# Patient Record
Sex: FEMALE | Race: BLACK OR AFRICAN AMERICAN | NOT HISPANIC OR LATINO
[De-identification: names, ages, dates, MRNs, and addresses within clinical notes are randomized per-mention and may not be internally consistent; named-entity substitution may affect disease eponyms.]

---

## 2019-11-08 ENCOUNTER — APPOINTMENT (OUTPATIENT)
Dept: OPHTHALMOLOGY | Facility: CLINIC | Age: 64
End: 2019-11-08
Payer: MEDICARE

## 2019-11-08 ENCOUNTER — NON-APPOINTMENT (OUTPATIENT)
Age: 64
End: 2019-11-08

## 2019-11-08 PROCEDURE — 92012 INTRM OPH EXAM EST PATIENT: CPT

## 2019-11-08 PROCEDURE — 92002 INTRM OPH EXAM NEW PATIENT: CPT

## 2020-01-02 ENCOUNTER — APPOINTMENT (OUTPATIENT)
Dept: OPHTHALMOLOGY | Facility: CLINIC | Age: 65
End: 2020-01-02

## 2020-05-28 ENCOUNTER — APPOINTMENT (OUTPATIENT)
Dept: OPHTHALMOLOGY | Facility: CLINIC | Age: 65
End: 2020-05-28
Payer: MEDICARE

## 2020-05-28 ENCOUNTER — NON-APPOINTMENT (OUTPATIENT)
Age: 65
End: 2020-05-28

## 2020-05-28 PROCEDURE — 99442: CPT | Mod: 95

## 2020-06-01 ENCOUNTER — APPOINTMENT (OUTPATIENT)
Dept: OPHTHALMOLOGY | Facility: CLINIC | Age: 65
End: 2020-06-01

## 2020-06-16 ENCOUNTER — TRANSCRIPTION ENCOUNTER (OUTPATIENT)
Age: 65
End: 2020-06-16

## 2020-11-16 ENCOUNTER — NON-APPOINTMENT (OUTPATIENT)
Age: 65
End: 2020-11-16

## 2020-11-16 ENCOUNTER — APPOINTMENT (OUTPATIENT)
Dept: OPHTHALMOLOGY | Facility: CLINIC | Age: 65
End: 2020-11-16
Payer: MEDICARE

## 2020-11-16 PROCEDURE — 92012 INTRM OPH EXAM EST PATIENT: CPT

## 2020-11-16 PROCEDURE — 92025 CPTRIZED CORNEAL TOPOGRAPHY: CPT

## 2020-11-16 PROCEDURE — 92286 ANT SGM IMG I&R SPECLR MIC: CPT

## 2020-11-16 PROCEDURE — 92136 OPHTHALMIC BIOMETRY: CPT

## 2020-12-28 ENCOUNTER — APPOINTMENT (OUTPATIENT)
Dept: SURGERY | Facility: CLINIC | Age: 65
End: 2020-12-28
Payer: MEDICARE

## 2020-12-28 VITALS
HEIGHT: 68 IN | BODY MASS INDEX: 44.41 KG/M2 | SYSTOLIC BLOOD PRESSURE: 174 MMHG | WEIGHT: 293 LBS | DIASTOLIC BLOOD PRESSURE: 88 MMHG | OXYGEN SATURATION: 88 % | HEART RATE: 86 BPM

## 2020-12-28 DIAGNOSIS — Z87.891 PERSONAL HISTORY OF NICOTINE DEPENDENCE: ICD-10-CM

## 2020-12-28 DIAGNOSIS — I10 ESSENTIAL (PRIMARY) HYPERTENSION: ICD-10-CM

## 2020-12-28 DIAGNOSIS — I21.9 ACUTE MYOCARDIAL INFARCTION, UNSPECIFIED: ICD-10-CM

## 2020-12-28 DIAGNOSIS — K80.20 CALCULUS OF GALLBLADDER W/OUT CHOLECYSTITIS W/OUT OBSTRUCTION: ICD-10-CM

## 2020-12-28 DIAGNOSIS — E78.00 PURE HYPERCHOLESTEROLEMIA, UNSPECIFIED: ICD-10-CM

## 2020-12-28 DIAGNOSIS — Z78.9 OTHER SPECIFIED HEALTH STATUS: ICD-10-CM

## 2020-12-28 PROCEDURE — 99203 OFFICE O/P NEW LOW 30 MIN: CPT

## 2020-12-28 RX ORDER — ATORVASTATIN CALCIUM 80 MG/1
TABLET, FILM COATED ORAL
Refills: 0 | Status: ACTIVE | COMMUNITY

## 2020-12-28 RX ORDER — ASPIRIN 81 MG
81 TABLET,CHEWABLE ORAL
Refills: 0 | Status: ACTIVE | COMMUNITY

## 2020-12-28 RX ORDER — AMLODIPINE BESYLATE 5 MG/1
TABLET ORAL
Refills: 0 | Status: ACTIVE | COMMUNITY

## 2020-12-28 RX ORDER — FLUTICASONE FUROATE AND VILANTEROL TRIFENATATE 50; 25 UG/1; UG/1
POWDER RESPIRATORY (INHALATION)
Refills: 0 | Status: ACTIVE | COMMUNITY

## 2020-12-28 RX ORDER — LOSARTAN POTASSIUM 100 MG/1
TABLET, FILM COATED ORAL
Refills: 0 | Status: ACTIVE | COMMUNITY

## 2020-12-28 RX ORDER — CARVEDILOL 3.12 MG/1
TABLET, FILM COATED ORAL
Refills: 0 | Status: ACTIVE | COMMUNITY

## 2020-12-28 NOTE — CONSULT LETTER
[Dear  ___] : Dear  [unfilled], [Consult Letter:] : I had the pleasure of evaluating your patient, [unfilled]. [Consult Closing:] : Thank you very much for allowing me to participate in the care of this patient.  If you have any questions, please do not hesitate to contact me. [Sincerely,] : Sincerely, [FreeTextEntry3] : Smith Hsu MD\par

## 2020-12-28 NOTE — DATA REVIEWED
[FreeTextEntry1] : Date of Exam: 12-\par \par EXAM:  ULTRASOUND ABDOMEN COMPLETE\par \par HISTORY:  Female, 64 years-old with intermittent right lower quadrant pain x2 months\par \par TECHNIQUE:  Using real-time ultrasonography with a high-resolution broadband phased-array curved transducer, multiplanar gray scale images were obtained and supplemented with color Doppler. Static images are provided for review.\par \par COMPARISON:  None.  \par \par FINDINGS:  \par Liver:  Liver is upper limits of normal in size measuring 15.3 cm and diffusely echogenic with poor penetration suggesting severe hepatocellular disease such as steatosis. Portal vein demonstrates normal hepatopedal flow.\par \par Gallbladder: Multiple gallstones largest measuring 2.2 x 0.8 x 1.7 cm. Gallbladder wall is not thickened.\par \par Common Bile Duct: Normal measuring 0.4 cm diameter at the rosanne hepatis.\par \par Pancreas: The visualized portion of the body of the pancreas is within normal limits. The tail is obscured by overlying bowel gas. No pancreatic duct dilatation.\par \par Kidneys: Normal in size, morphology and cortical echotexture. \par No hydronephrosis, shadowing calculi or perinephric fluid.\par Right Kidney: 12.2 cm in length. \par Left Kidney:   11.7 cm in length. \par \par Spleen: Normal size, contour and echotexture measuring 9.6 cm in length. \par \par Abdominal Aorta/IVC: No evidence of abdominal aortic aneurysm. Visualized portions of the IVC were patent.\par \par IMPRESSION:  \par Diffusely echogenic and prominent liver consistent with hepatic steatosis.   \par Multiple gallstones with a normal common duct and normal gallbladder wall thickness.

## 2020-12-28 NOTE — PHYSICAL EXAM
[Normal Breath Sounds] : Normal breath sounds [Normal Rate and Rhythm] : normal rate and rhythm [No Rash or Lesion] : No rash or lesion [Alert] : alert [Oriented to Person] : oriented to person [Oriented to Place] : oriented to place [Oriented to Time] : oriented to time [Calm] : calm [JVD] : no jugular venous distention  [de-identified] : A/Ox3; NAD. appears comfortable [de-identified] : EOMI; sclera anicteric. Nasal mucosa pink, septum midline. Oral mucosa pink. Tongue midline, Pharynx without exudates. [de-identified] : has tenderness to the RUQ, abd is obese, soft, ND [de-identified] : +ROM, no joint swelling

## 2020-12-28 NOTE — REVIEW OF SYSTEMS
[As Noted in HPI] : as noted in HPI [Abdominal Pain] : abdominal pain [Arthralgias] : arthralgias [Fever] : no fever [Chills] : no chills [Feeling Poorly] : not feeling poorly [Chest Pain] : no chest pain [Lower Ext Edema] : no lower extremity edema [Shortness Of Breath] : no shortness of breath [Cough] : no cough [Vomiting] : no vomiting [Skin Lesions] : no skin lesions [Skin Wound] : no skin wound [Dizziness] : no dizziness [Anxiety] : no anxiety [Muscle Weakness] : no muscle weakness [Swollen Glands] : no swollen glands [FreeTextEntry5] : hx of MI in 2003, on ASA. Denies CP [FreeTextEntry7] : RUQ abdominal pain c/w gallstones

## 2020-12-28 NOTE — HISTORY OF PRESENT ILLNESS
[de-identified] : CONNIE OSULLIVAN is a 65 year old morbidly obese F w PMHX MI (2003) on ASA, HLD, HTN, referred to the office for consultation visit, she presents w the cc, sharp RUQ abdominal pain. Patient denies nausea/vomiting associated with the pain. She denies prior ER visits. She has tenderness to the R. side of abdomen, which is on and off over the past three months. Denies previous abdominal surgery.

## 2020-12-28 NOTE — ASSESSMENT
[FreeTextEntry1] : Patient is a 65 y.o F with symptomatic gallstones confirmed on imaging, and history and symptoms consistent with symptomatic cholelithiasis.\par

## 2020-12-28 NOTE — PLAN
[FreeTextEntry1] : Ms. CONNIE OSULLIVAN was informed of significance of findings. All the options, risks and benefits were discussed at length, including the potential to go open, small potential for bleeding, CBD injury, bile leak, and other related complications. Informed consent for laparoscopic/possible open cholecystectomy and potential risks, benefits and alternatives (surgical options were discussed including non-surgical options or the option of no surgery) to the planned surgery were discussed in depth. \par All surgical options were discussed including non-surgical treatments. Patient wishes to proceed with surgery. We will plan for surgery at [facility], at the next available date, pending any required insurance pre-certification or pre-approval. Patient agrees to obtain any necessary pre-operative evaluations and testing prior to surgery.\par Patient advised to seek immediate medical attention with any acute change in symptoms or with the development of any new or worsening symptoms. Patient's questions and concerns addressed to patient's satisfaction, and patient verbalized an understanding of the information discussed.\par \par

## 2021-01-13 ENCOUNTER — OUTPATIENT (OUTPATIENT)
Dept: OUTPATIENT SERVICES | Facility: HOSPITAL | Age: 66
LOS: 1 days | End: 2021-01-13
Payer: MEDICARE

## 2021-01-13 VITALS
OXYGEN SATURATION: 96 % | HEART RATE: 75 BPM | SYSTOLIC BLOOD PRESSURE: 158 MMHG | DIASTOLIC BLOOD PRESSURE: 85 MMHG | TEMPERATURE: 98 F | WEIGHT: 293 LBS | RESPIRATION RATE: 18 BRPM | HEIGHT: 65 IN

## 2021-01-13 DIAGNOSIS — I10 ESSENTIAL (PRIMARY) HYPERTENSION: ICD-10-CM

## 2021-01-13 DIAGNOSIS — R06.00 DYSPNEA, UNSPECIFIED: ICD-10-CM

## 2021-01-13 DIAGNOSIS — Z98.49 CATARACT EXTRACTION STATUS, UNSPECIFIED EYE: Chronic | ICD-10-CM

## 2021-01-13 DIAGNOSIS — K80.20 CALCULUS OF GALLBLADDER WITHOUT CHOLECYSTITIS WITHOUT OBSTRUCTION: ICD-10-CM

## 2021-01-13 DIAGNOSIS — Z94.7 CORNEAL TRANSPLANT STATUS: Chronic | ICD-10-CM

## 2021-01-13 DIAGNOSIS — Z96.653 PRESENCE OF ARTIFICIAL KNEE JOINT, BILATERAL: Chronic | ICD-10-CM

## 2021-01-13 DIAGNOSIS — Z98.890 OTHER SPECIFIED POSTPROCEDURAL STATES: Chronic | ICD-10-CM

## 2021-01-13 DIAGNOSIS — Z01.818 ENCOUNTER FOR OTHER PREPROCEDURAL EXAMINATION: ICD-10-CM

## 2021-01-13 LAB — BLD GP AB SCN SERPL QL: SIGNIFICANT CHANGE UP

## 2021-01-13 PROCEDURE — G0463: CPT

## 2021-01-13 NOTE — H&P PST ADULT - NSICDXPROBLEM_GEN_ALL_CORE_FT
PROBLEM DIAGNOSES  Problem: Calculus of gallbladder without cholecystitis without obstruction  Assessment and Plan: Patient is scheduled for laparoscopic cholecystectomy. Preoperative instructions given to patient with understanding verbalized.     Problem: Dyspnea  Assessment and Plan: Patient instructed with understanding verbalized to use breo ellpita the morning of surgery.    Problem: Hypertension  Assessment and Plan: Patient instructed with understanding verbalized to take amlodipine, carvedilol and losartan with a sip of water the morning of surgery.

## 2021-01-13 NOTE — H&P PST ADULT - NSICDXPASTSURGICALHX_GEN_ALL_CORE_FT
PAST SURGICAL HISTORY:  History of cataract extraction Left    History of corneal transplant Bilateral eye    History of lumpectomy Left breast x 2    History of total bilateral knee replacement

## 2021-01-13 NOTE — H&P PST ADULT - NSANTHOSAYNRD_GEN_A_CORE
No. MIGUEL ÁNGEL screening performed.  STOP BANG Legend: 0-2 = LOW Risk; 3-4 = INTERMEDIATE Risk; 5-8 = HIGH Risk

## 2021-01-13 NOTE — H&P PST ADULT - HISTORY OF PRESENT ILLNESS
65year old morbidly obese female with pmhx of hypertension, fatty liver disease, MI 2003, hyperlipidemia, bilateral eye corneal disease, left breast benign lump and bilateral knee osteoarthritis  presents with c/o RUQ area discomfort since oct 2020. Patient denies any nausea, vomiting today and is here for presurgical testing for scheduled laparoscopic cholecystectomy on 1/22/2021

## 2021-01-13 NOTE — H&P PST ADULT - NSALCOHOLTYPE_GEN__A_CORE_SD
[facility] Emergency Department    [address] [place] [address]    Phone:  [phone]    Fax:  [phone]                                       Katy Islas   MRN: [id number]    Department:  [facility] Emergency Department   Date of Visit:  1/8/2017           After Visit Summary Signature Page     I have received my discharge instructions, and my questions have been answered. I have discussed any challenges I see with this plan with the nurse or doctor.    ..........................................................................................................................................  Patient/Patient Representative Signature      ..........................................................................................................................................  Patient Representative Print Name and Relationship to Patient    ..................................................               ................................................  Date                                            Time    ..........................................................................................................................................  Reviewed by Signature/Title    ...................................................              ..............................................  Date                                                            Time           socially/wine

## 2021-01-16 DIAGNOSIS — Z01.818 ENCOUNTER FOR OTHER PREPROCEDURAL EXAMINATION: ICD-10-CM

## 2021-01-19 ENCOUNTER — APPOINTMENT (OUTPATIENT)
Dept: DISASTER EMERGENCY | Facility: CLINIC | Age: 66
End: 2021-01-19

## 2021-01-20 LAB — SARS-COV-2 N GENE NPH QL NAA+PROBE: NOT DETECTED

## 2021-01-21 ENCOUNTER — TRANSCRIPTION ENCOUNTER (OUTPATIENT)
Age: 66
End: 2021-01-21

## 2021-01-22 ENCOUNTER — RESULT REVIEW (OUTPATIENT)
Age: 66
End: 2021-01-22

## 2021-01-22 ENCOUNTER — OUTPATIENT (OUTPATIENT)
Dept: OUTPATIENT SERVICES | Facility: HOSPITAL | Age: 66
LOS: 1 days | End: 2021-01-22
Payer: MEDICARE

## 2021-01-22 ENCOUNTER — APPOINTMENT (OUTPATIENT)
Dept: SURGERY | Facility: HOSPITAL | Age: 66
End: 2021-01-22

## 2021-01-22 VITALS
HEART RATE: 89 BPM | OXYGEN SATURATION: 100 % | WEIGHT: 293 LBS | DIASTOLIC BLOOD PRESSURE: 85 MMHG | RESPIRATION RATE: 16 BRPM | HEIGHT: 65 IN | TEMPERATURE: 98 F | SYSTOLIC BLOOD PRESSURE: 145 MMHG

## 2021-01-22 VITALS
HEART RATE: 76 BPM | DIASTOLIC BLOOD PRESSURE: 77 MMHG | OXYGEN SATURATION: 96 % | SYSTOLIC BLOOD PRESSURE: 131 MMHG | RESPIRATION RATE: 17 BRPM | TEMPERATURE: 98 F

## 2021-01-22 DIAGNOSIS — Z98.890 OTHER SPECIFIED POSTPROCEDURAL STATES: Chronic | ICD-10-CM

## 2021-01-22 DIAGNOSIS — Z94.7 CORNEAL TRANSPLANT STATUS: Chronic | ICD-10-CM

## 2021-01-22 DIAGNOSIS — Z01.818 ENCOUNTER FOR OTHER PREPROCEDURAL EXAMINATION: ICD-10-CM

## 2021-01-22 DIAGNOSIS — K80.20 CALCULUS OF GALLBLADDER WITHOUT CHOLECYSTITIS WITHOUT OBSTRUCTION: ICD-10-CM

## 2021-01-22 DIAGNOSIS — Z96.653 PRESENCE OF ARTIFICIAL KNEE JOINT, BILATERAL: Chronic | ICD-10-CM

## 2021-01-22 DIAGNOSIS — Z98.49 CATARACT EXTRACTION STATUS, UNSPECIFIED EYE: Chronic | ICD-10-CM

## 2021-01-22 LAB — BLD GP AB SCN SERPL QL: SIGNIFICANT CHANGE UP

## 2021-01-22 PROCEDURE — 88304 TISSUE EXAM BY PATHOLOGIST: CPT | Mod: 26

## 2021-01-22 PROCEDURE — 36415 COLL VENOUS BLD VENIPUNCTURE: CPT

## 2021-01-22 PROCEDURE — 86900 BLOOD TYPING SEROLOGIC ABO: CPT

## 2021-01-22 PROCEDURE — 47562 LAPAROSCOPIC CHOLECYSTECTOMY: CPT

## 2021-01-22 PROCEDURE — 86850 RBC ANTIBODY SCREEN: CPT

## 2021-01-22 PROCEDURE — 88304 TISSUE EXAM BY PATHOLOGIST: CPT

## 2021-01-22 PROCEDURE — 86901 BLOOD TYPING SEROLOGIC RH(D): CPT

## 2021-01-22 RX ORDER — DOCUSATE SODIUM 100 MG
1 CAPSULE ORAL
Qty: 14 | Refills: 0
Start: 2021-01-22 | End: 2021-01-28

## 2021-01-22 RX ORDER — FENTANYL CITRATE 50 UG/ML
50 INJECTION INTRAVENOUS
Refills: 0 | Status: DISCONTINUED | OUTPATIENT
Start: 2021-01-22 | End: 2021-01-22

## 2021-01-22 RX ORDER — OXYCODONE HYDROCHLORIDE 5 MG/1
5 TABLET ORAL ONCE
Refills: 0 | Status: DISCONTINUED | OUTPATIENT
Start: 2021-01-22 | End: 2021-01-22

## 2021-01-22 RX ORDER — SODIUM CHLORIDE 9 MG/ML
3 INJECTION INTRAMUSCULAR; INTRAVENOUS; SUBCUTANEOUS EVERY 8 HOURS
Refills: 0 | Status: DISCONTINUED | OUTPATIENT
Start: 2021-01-22 | End: 2021-01-22

## 2021-01-22 RX ORDER — AMLODIPINE BESYLATE 2.5 MG/1
1 TABLET ORAL
Qty: 0 | Refills: 0 | DISCHARGE

## 2021-01-22 RX ORDER — ONDANSETRON 8 MG/1
4 TABLET, FILM COATED ORAL ONCE
Refills: 0 | Status: DISCONTINUED | OUTPATIENT
Start: 2021-01-22 | End: 2021-01-22

## 2021-01-22 RX ORDER — OXYCODONE HYDROCHLORIDE 5 MG/1
1 TABLET ORAL
Qty: 25 | Refills: 0
Start: 2021-01-22 | End: 2021-01-26

## 2021-01-22 RX ORDER — LOSARTAN/HYDROCHLOROTHIAZIDE 100MG-25MG
1 TABLET ORAL
Qty: 0 | Refills: 0 | DISCHARGE

## 2021-01-22 RX ORDER — ASPIRIN/CALCIUM CARB/MAGNESIUM 324 MG
0 TABLET ORAL
Qty: 0 | Refills: 0 | DISCHARGE

## 2021-01-22 RX ORDER — ATORVASTATIN CALCIUM 80 MG/1
1 TABLET, FILM COATED ORAL
Qty: 0 | Refills: 0 | DISCHARGE

## 2021-01-22 RX ORDER — CARVEDILOL PHOSPHATE 80 MG/1
1 CAPSULE, EXTENDED RELEASE ORAL
Qty: 0 | Refills: 0 | DISCHARGE

## 2021-01-22 RX ORDER — FLUTICASONE FUROATE AND VILANTEROL TRIFENATATE 100; 25 UG/1; UG/1
1 POWDER RESPIRATORY (INHALATION)
Qty: 0 | Refills: 0 | DISCHARGE

## 2021-01-22 RX ORDER — SENNA PLUS 8.6 MG/1
1 TABLET ORAL
Qty: 7 | Refills: 0
Start: 2021-01-22 | End: 2021-01-28

## 2021-01-22 RX ORDER — FENTANYL CITRATE 50 UG/ML
25 INJECTION INTRAVENOUS
Refills: 0 | Status: DISCONTINUED | OUTPATIENT
Start: 2021-01-22 | End: 2021-01-22

## 2021-01-22 RX ADMIN — SODIUM CHLORIDE 3 MILLILITER(S): 9 INJECTION INTRAMUSCULAR; INTRAVENOUS; SUBCUTANEOUS at 07:25

## 2021-01-22 NOTE — ASU PATIENT PROFILE, ADULT - PSH
History of cataract extraction  Left  History of corneal transplant  Bilateral eye  History of lumpectomy  Left breast x 2  History of total bilateral knee replacement

## 2021-01-22 NOTE — ASU PATIENT PROFILE, ADULT - PMH
Breast lump  Left  Cataract    Fatty liver    Hyperlipidemia    Hypertension    Morbidly obese    Myocardial infarction    Osteoarthritis

## 2021-01-22 NOTE — ASU DISCHARGE PLAN (ADULT/PEDIATRIC) - ASU DC SPECIAL INSTRUCTIONSFT
you may take off the outer dressing (clear tape and gauze) in 2 days and shower normally at this time, no bathing or soaking for at least 2 weeks. leave the skin tape in place; they will come off on their own with showering. no heavy lifting for 4 weeks; your incisions will take time to heal and strengthen. you may have some diarrhea at first since your gallbladder has been removed, but this should resolve after some time. take tylenol and ibuprofen as needed for pain. take the prescribed oxycodone in addition if pain is still an issue. be aware that oxycodone can cause constipation; take the provided stool softeners if taking this medication.

## 2021-01-22 NOTE — ASU DISCHARGE PLAN (ADULT/PEDIATRIC) - CARE PROVIDER_API CALL
Smith Hsu (MD)  Surgery  [address] [place] [id number]  Phone: [phone]  Fax: [phone]  Follow Up Time:

## 2021-01-26 PROBLEM — I10 ESSENTIAL (PRIMARY) HYPERTENSION: Chronic | Status: ACTIVE | Noted: 2021-01-13

## 2021-01-26 PROBLEM — E78.5 HYPERLIPIDEMIA, UNSPECIFIED: Chronic | Status: ACTIVE | Noted: 2021-01-13

## 2021-01-26 PROBLEM — E66.01 MORBID (SEVERE) OBESITY DUE TO EXCESS CALORIES: Chronic | Status: ACTIVE | Noted: 2021-01-13

## 2021-01-26 PROBLEM — K76.0 FATTY (CHANGE OF) LIVER, NOT ELSEWHERE CLASSIFIED: Chronic | Status: ACTIVE | Noted: 2021-01-13

## 2021-01-26 PROBLEM — M19.90 UNSPECIFIED OSTEOARTHRITIS, UNSPECIFIED SITE: Chronic | Status: ACTIVE | Noted: 2021-01-13

## 2021-01-26 PROBLEM — N63.0 UNSPECIFIED LUMP IN UNSPECIFIED BREAST: Chronic | Status: ACTIVE | Noted: 2021-01-13

## 2021-01-26 PROBLEM — I21.9 ACUTE MYOCARDIAL INFARCTION, UNSPECIFIED: Chronic | Status: ACTIVE | Noted: 2021-01-13

## 2021-01-26 PROBLEM — H26.9 UNSPECIFIED CATARACT: Chronic | Status: ACTIVE | Noted: 2021-01-13

## 2021-01-26 LAB — SURGICAL PATHOLOGY STUDY: SIGNIFICANT CHANGE UP

## 2021-02-01 ENCOUNTER — APPOINTMENT (OUTPATIENT)
Dept: SURGERY | Facility: CLINIC | Age: 66
End: 2021-02-01

## 2021-02-04 ENCOUNTER — APPOINTMENT (OUTPATIENT)
Dept: SURGERY | Facility: CLINIC | Age: 66
End: 2021-02-04

## 2021-02-05 NOTE — HISTORY OF PRESENT ILLNESS
[de-identified] : Ms. CONNIE OSULLIVAN is a 65 year y/o F who presents to the office for post operative visit today, patient is s/p laparoscopic cholecystectomy on 01/22/21; path results c/w chronic calculous cholecystitis, cholesterolosis.\par \par \par

## 2021-02-08 ENCOUNTER — APPOINTMENT (OUTPATIENT)
Dept: SURGERY | Facility: CLINIC | Age: 66
End: 2021-02-08

## 2021-02-18 ENCOUNTER — APPOINTMENT (OUTPATIENT)
Dept: SURGERY | Facility: CLINIC | Age: 66
End: 2021-02-18

## 2021-02-18 NOTE — REASON FOR VISIT
[Post Op: _________] : a [unfilled] post op visit [FreeTextEntry1] : S/P laparoscopic cholecystectomy, 01/27/21

## 2021-02-18 NOTE — HISTORY OF PRESENT ILLNESS
[de-identified] : CONNIE OSULLIVAN presents to the office for postoperative visit today, she is S/P laparoscopic cholecystectomy, 01/27/21. Pathology results c/w chronic calculous cholecystitis, cholesterolosis.

## 2021-02-25 ENCOUNTER — APPOINTMENT (OUTPATIENT)
Dept: SURGERY | Facility: CLINIC | Age: 66
End: 2021-02-25
Payer: MEDICARE

## 2021-02-25 VITALS — TEMPERATURE: 97.1 F

## 2021-02-25 PROCEDURE — 99024 POSTOP FOLLOW-UP VISIT: CPT

## 2021-02-25 NOTE — ASSESSMENT
[FreeTextEntry1] : Ms. CONNIE OSULLIVAN is a 65 year y/o F who presents to the office for post operative visit today, patient is s/p laparoscopic cholecystectomy on 01/22/21 ; path results c/w chronic calculous cholecystitis, cholesterolosis. Patient without reported complaints. \par \par Incision sites healing well and as expected. There is no evidence of infection/complication, and patient is progressing as expected. Post-operative wound care, activities, restrictions and precautions were reinforced. Pathology results were discussed in detail. Patient's questions and concerns addressed to patient's satisfaction.\par

## 2021-02-25 NOTE — HISTORY OF PRESENT ILLNESS
[de-identified] : Ms. CONNIE OSULLIVAN is a 65 year y/o F who presents to the office for post operative visit today, patient is s/p laparoscopic cholecystectomy on 01/22/21 ; path results c/w chronic calculous cholecystitis, cholesterolosis.\par Patient is without reported complaints. Denies abdominal pain. No nausea/vomiting. No fevers/chills. Patient is tolerating a regular diet with normal appetite. Normal BM's.\par

## 2021-02-25 NOTE — PHYSICAL EXAM
[Normal Breath Sounds] : Normal breath sounds [Normal Rate and Rhythm] : normal rate and rhythm [No Rash or Lesion] : No rash or lesion [Alert] : alert [Oriented to Person] : oriented to person [Oriented to Place] : oriented to place [Oriented to Time] : oriented to time [Calm] : calm [de-identified] : A/Ox3; NAD. appears comfortable [de-identified] : EOMI; sclera anicteric. Nasal mucosa pink, septum midline. Oral mucosa pink. Tongue midline, Pharynx without exudates. [de-identified] : Abdomen soft and non tender. Wounds healing well. Port sites with no erythema or drainage.

## 2021-02-25 NOTE — REASON FOR VISIT
[Post Op: _________] : a [unfilled] post op visit [FreeTextEntry1] : S/P laparoscopic cholecystectomy, 01/22/21

## 2021-04-10 ENCOUNTER — APPOINTMENT (OUTPATIENT)
Dept: DISASTER EMERGENCY | Facility: CLINIC | Age: 66
End: 2021-04-10

## 2021-04-13 ENCOUNTER — APPOINTMENT (OUTPATIENT)
Dept: OPHTHALMOLOGY | Facility: AMBULATORY SURGERY CENTER | Age: 66
End: 2021-04-13

## 2021-04-14 ENCOUNTER — APPOINTMENT (OUTPATIENT)
Dept: OPHTHALMOLOGY | Facility: CLINIC | Age: 66
End: 2021-04-14

## 2021-04-21 ENCOUNTER — APPOINTMENT (OUTPATIENT)
Dept: OPHTHALMOLOGY | Facility: CLINIC | Age: 66
End: 2021-04-21

## 2021-05-12 NOTE — H&P PST ADULT - VENOUS THROMBOEMBOLISM CURRENT STATUS
12-May-2021
(1) swollen legs (current)/(1) other risk factor (includes escalating BMI, pack-years of smoking, diabetes requiring insulin, chemotherapy, female gender and length of surgery)

## 2021-10-14 NOTE — H&P PST ADULT - SPO2 (%)
96 Intermediate Repair Preamble Text (Leave Blank If You Do Not Want): Undermining was performed with blunt dissection.
